# Patient Record
Sex: FEMALE | Race: OTHER | NOT HISPANIC OR LATINO
[De-identification: names, ages, dates, MRNs, and addresses within clinical notes are randomized per-mention and may not be internally consistent; named-entity substitution may affect disease eponyms.]

---

## 2020-07-08 ENCOUNTER — TRANSCRIPTION ENCOUNTER (OUTPATIENT)
Age: 50
End: 2020-07-08

## 2020-07-09 ENCOUNTER — INPATIENT (INPATIENT)
Facility: HOSPITAL | Age: 50
LOS: 0 days | Discharge: HOME | DRG: 351 | End: 2020-07-10
Attending: SURGERY | Admitting: SURGERY
Payer: COMMERCIAL

## 2020-07-09 ENCOUNTER — EMERGENCY (EMERGENCY)
Facility: HOSPITAL | Age: 50
LOS: 1 days | Discharge: HOME | End: 2020-07-09
Attending: EMERGENCY MEDICINE
Payer: COMMERCIAL

## 2020-07-09 VITALS
WEIGHT: 138.01 LBS | TEMPERATURE: 98 F | HEIGHT: 61 IN | SYSTOLIC BLOOD PRESSURE: 148 MMHG | RESPIRATION RATE: 16 BRPM | OXYGEN SATURATION: 99 % | DIASTOLIC BLOOD PRESSURE: 86 MMHG | HEART RATE: 78 BPM

## 2020-07-09 VITALS
OXYGEN SATURATION: 98 % | RESPIRATION RATE: 18 BRPM | TEMPERATURE: 98 F | SYSTOLIC BLOOD PRESSURE: 143 MMHG | HEIGHT: 61 IN | WEIGHT: 138.89 LBS | DIASTOLIC BLOOD PRESSURE: 92 MMHG | HEART RATE: 87 BPM

## 2020-07-09 DIAGNOSIS — K40.90 UNILATERAL INGUINAL HERNIA, WITHOUT OBSTRUCTION OR GANGRENE, NOT SPECIFIED AS RECURRENT: ICD-10-CM

## 2020-07-09 LAB
ALBUMIN SERPL ELPH-MCNC: 3.9 G/DL — SIGNIFICANT CHANGE UP (ref 3.5–5)
ALP SERPL-CCNC: 54 U/L — SIGNIFICANT CHANGE UP (ref 40–120)
ALT FLD-CCNC: 18 U/L DA — SIGNIFICANT CHANGE UP (ref 10–60)
ANION GAP SERPL CALC-SCNC: 8 MMOL/L — SIGNIFICANT CHANGE UP (ref 5–17)
APPEARANCE UR: CLEAR — SIGNIFICANT CHANGE UP
APTT BLD: 29.3 SEC — SIGNIFICANT CHANGE UP (ref 27.5–35.5)
AST SERPL-CCNC: 15 U/L — SIGNIFICANT CHANGE UP (ref 10–40)
BASOPHILS # BLD AUTO: 0.07 K/UL — SIGNIFICANT CHANGE UP (ref 0–0.2)
BASOPHILS NFR BLD AUTO: 0.8 % — SIGNIFICANT CHANGE UP (ref 0–2)
BILIRUB SERPL-MCNC: 0.4 MG/DL — SIGNIFICANT CHANGE UP (ref 0.2–1.2)
BILIRUB UR-MCNC: NEGATIVE — SIGNIFICANT CHANGE UP
BLD GP AB SCN SERPL QL: SIGNIFICANT CHANGE UP
BUN SERPL-MCNC: 7 MG/DL — SIGNIFICANT CHANGE UP (ref 7–18)
CALCIUM SERPL-MCNC: 8.6 MG/DL — SIGNIFICANT CHANGE UP (ref 8.4–10.5)
CHLORIDE SERPL-SCNC: 106 MMOL/L — SIGNIFICANT CHANGE UP (ref 96–108)
CO2 SERPL-SCNC: 26 MMOL/L — SIGNIFICANT CHANGE UP (ref 22–31)
COLOR SPEC: YELLOW — SIGNIFICANT CHANGE UP
CREAT SERPL-MCNC: 0.59 MG/DL — SIGNIFICANT CHANGE UP (ref 0.5–1.3)
DIFF PNL FLD: ABNORMAL
EOSINOPHIL # BLD AUTO: 0.12 K/UL — SIGNIFICANT CHANGE UP (ref 0–0.5)
EOSINOPHIL NFR BLD AUTO: 1.3 % — SIGNIFICANT CHANGE UP (ref 0–6)
EPI CELLS # UR: SIGNIFICANT CHANGE UP /HPF
GLUCOSE SERPL-MCNC: 87 MG/DL — SIGNIFICANT CHANGE UP (ref 70–99)
GLUCOSE UR QL: NEGATIVE — SIGNIFICANT CHANGE UP
HCG SERPL-ACNC: <1 MIU/ML — SIGNIFICANT CHANGE UP
HCT VFR BLD CALC: 39.1 % — SIGNIFICANT CHANGE UP (ref 34.5–45)
HGB BLD-MCNC: 12.7 G/DL — SIGNIFICANT CHANGE UP (ref 11.5–15.5)
IMM GRANULOCYTES NFR BLD AUTO: 0.2 % — SIGNIFICANT CHANGE UP (ref 0–1.5)
INR BLD: 1.11 RATIO — SIGNIFICANT CHANGE UP (ref 0.88–1.16)
KETONES UR-MCNC: ABNORMAL
LEUKOCYTE ESTERASE UR-ACNC: ABNORMAL
LYMPHOCYTES # BLD AUTO: 1.71 K/UL — SIGNIFICANT CHANGE UP (ref 1–3.3)
LYMPHOCYTES # BLD AUTO: 18.4 % — SIGNIFICANT CHANGE UP (ref 13–44)
MCHC RBC-ENTMCNC: 28.1 PG — SIGNIFICANT CHANGE UP (ref 27–34)
MCHC RBC-ENTMCNC: 32.5 GM/DL — SIGNIFICANT CHANGE UP (ref 32–36)
MCV RBC AUTO: 86.5 FL — SIGNIFICANT CHANGE UP (ref 80–100)
MONOCYTES # BLD AUTO: 0.47 K/UL — SIGNIFICANT CHANGE UP (ref 0–0.9)
MONOCYTES NFR BLD AUTO: 5.1 % — SIGNIFICANT CHANGE UP (ref 2–14)
NEUTROPHILS # BLD AUTO: 6.91 K/UL — SIGNIFICANT CHANGE UP (ref 1.8–7.4)
NEUTROPHILS NFR BLD AUTO: 74.2 % — SIGNIFICANT CHANGE UP (ref 43–77)
NITRITE UR-MCNC: NEGATIVE — SIGNIFICANT CHANGE UP
NRBC # BLD: 0 /100 WBCS — SIGNIFICANT CHANGE UP (ref 0–0)
PH UR: 6.5 — SIGNIFICANT CHANGE UP (ref 5–8)
PLATELET # BLD AUTO: 186 K/UL — SIGNIFICANT CHANGE UP (ref 150–400)
POTASSIUM SERPL-MCNC: 4.7 MMOL/L — SIGNIFICANT CHANGE UP (ref 3.5–5.3)
POTASSIUM SERPL-SCNC: 4.7 MMOL/L — SIGNIFICANT CHANGE UP (ref 3.5–5.3)
PROT SERPL-MCNC: 7.8 G/DL — SIGNIFICANT CHANGE UP (ref 6–8.3)
PROT UR-MCNC: 15
PROTHROM AB SERPL-ACNC: 12.9 SEC — SIGNIFICANT CHANGE UP (ref 10.6–13.6)
RBC # BLD: 4.52 M/UL — SIGNIFICANT CHANGE UP (ref 3.8–5.2)
RBC # FLD: 13.4 % — SIGNIFICANT CHANGE UP (ref 10.3–14.5)
RBC CASTS # UR COMP ASSIST: ABNORMAL /HPF (ref 0–2)
SARS-COV-2 RNA SPEC QL NAA+PROBE: SIGNIFICANT CHANGE UP
SODIUM SERPL-SCNC: 140 MMOL/L — SIGNIFICANT CHANGE UP (ref 135–145)
SP GR SPEC: 1.01 — SIGNIFICANT CHANGE UP (ref 1.01–1.02)
UROBILINOGEN FLD QL: NEGATIVE — SIGNIFICANT CHANGE UP
WBC # BLD: 9.3 K/UL — SIGNIFICANT CHANGE UP (ref 3.8–10.5)
WBC # FLD AUTO: 9.3 K/UL — SIGNIFICANT CHANGE UP (ref 3.8–10.5)
WBC UR QL: SIGNIFICANT CHANGE UP /HPF (ref 0–5)

## 2020-07-09 PROCEDURE — 86850 RBC ANTIBODY SCREEN: CPT

## 2020-07-09 PROCEDURE — 99223 1ST HOSP IP/OBS HIGH 75: CPT | Mod: 57

## 2020-07-09 PROCEDURE — 74177 CT ABD & PELVIS W/CONTRAST: CPT | Mod: 26

## 2020-07-09 PROCEDURE — 99284 EMERGENCY DEPT VISIT MOD MDM: CPT

## 2020-07-09 PROCEDURE — 84702 CHORIONIC GONADOTROPIN TEST: CPT

## 2020-07-09 PROCEDURE — 86901 BLOOD TYPING SEROLOGIC RH(D): CPT

## 2020-07-09 PROCEDURE — 36415 COLL VENOUS BLD VENIPUNCTURE: CPT

## 2020-07-09 PROCEDURE — 86900 BLOOD TYPING SEROLOGIC ABO: CPT

## 2020-07-09 PROCEDURE — 49650 LAP ING HERNIA REPAIR INIT: CPT

## 2020-07-09 PROCEDURE — 85027 COMPLETE CBC AUTOMATED: CPT

## 2020-07-09 PROCEDURE — 74177 CT ABD & PELVIS W/CONTRAST: CPT

## 2020-07-09 PROCEDURE — 49650 LAP ING HERNIA REPAIR INIT: CPT | Mod: AS

## 2020-07-09 PROCEDURE — 71045 X-RAY EXAM CHEST 1 VIEW: CPT | Mod: 26

## 2020-07-09 PROCEDURE — 93010 ELECTROCARDIOGRAM REPORT: CPT

## 2020-07-09 PROCEDURE — 96374 THER/PROPH/DIAG INJ IV PUSH: CPT

## 2020-07-09 PROCEDURE — 99284 EMERGENCY DEPT VISIT MOD MDM: CPT | Mod: 25

## 2020-07-09 PROCEDURE — 81001 URINALYSIS AUTO W/SCOPE: CPT

## 2020-07-09 PROCEDURE — 80053 COMPREHEN METABOLIC PANEL: CPT

## 2020-07-09 PROCEDURE — 99283 EMERGENCY DEPT VISIT LOW MDM: CPT

## 2020-07-09 RX ORDER — IOHEXOL 300 MG/ML
30 INJECTION, SOLUTION INTRAVENOUS ONCE
Refills: 0 | Status: COMPLETED | OUTPATIENT
Start: 2020-07-09 | End: 2020-07-09

## 2020-07-09 RX ORDER — SODIUM CHLORIDE 9 MG/ML
1000 INJECTION, SOLUTION INTRAVENOUS
Refills: 0 | Status: DISCONTINUED | OUTPATIENT
Start: 2020-07-09 | End: 2020-07-09

## 2020-07-09 RX ORDER — MORPHINE SULFATE 50 MG/1
2 CAPSULE, EXTENDED RELEASE ORAL EVERY 4 HOURS
Refills: 0 | Status: DISCONTINUED | OUTPATIENT
Start: 2020-07-09 | End: 2020-07-09

## 2020-07-09 RX ORDER — KETOROLAC TROMETHAMINE 30 MG/ML
30 SYRINGE (ML) INJECTION ONCE
Refills: 0 | Status: DISCONTINUED | OUTPATIENT
Start: 2020-07-09 | End: 2020-07-09

## 2020-07-09 RX ORDER — IBUPROFEN 200 MG
1 TABLET ORAL
Qty: 20 | Refills: 0
Start: 2020-07-09 | End: 2020-07-13

## 2020-07-09 RX ORDER — ONDANSETRON 8 MG/1
4 TABLET, FILM COATED ORAL EVERY 6 HOURS
Refills: 0 | Status: DISCONTINUED | OUTPATIENT
Start: 2020-07-09 | End: 2020-07-09

## 2020-07-09 RX ORDER — SODIUM CHLORIDE 9 MG/ML
1000 INJECTION INTRAMUSCULAR; INTRAVENOUS; SUBCUTANEOUS ONCE
Refills: 0 | Status: COMPLETED | OUTPATIENT
Start: 2020-07-09 | End: 2020-07-09

## 2020-07-09 RX ADMIN — IOHEXOL 30 MILLILITER(S): 300 INJECTION, SOLUTION INTRAVENOUS at 12:18

## 2020-07-09 RX ADMIN — ONDANSETRON 4 MILLIGRAM(S): 8 TABLET, FILM COATED ORAL at 22:10

## 2020-07-09 RX ADMIN — SODIUM CHLORIDE 1000 MILLILITER(S): 9 INJECTION INTRAMUSCULAR; INTRAVENOUS; SUBCUTANEOUS at 12:19

## 2020-07-09 RX ADMIN — SODIUM CHLORIDE 100 MILLILITER(S): 9 INJECTION, SOLUTION INTRAVENOUS at 22:10

## 2020-07-09 RX ADMIN — Medication 30 MILLIGRAM(S): at 14:15

## 2020-07-09 RX ADMIN — MORPHINE SULFATE 2 MILLIGRAM(S): 50 CAPSULE, EXTENDED RELEASE ORAL at 22:10

## 2020-07-09 NOTE — H&P ADULT - ATTENDING COMMENTS
51 yo woman with incarcerated left groin hernia - inguinal or femoral, likely containing omentum on CT, symptomatic for > 2 days.     Risk benefits and alternatives to the surgery discussed with the patient and informed consent was signed.   Plan for lap hernia repair tonight

## 2020-07-09 NOTE — H&P ADULT - HISTORY OF PRESENT ILLNESS
50F with no significant pmhx or pshx presents to the ED c/o left groin pain that started 2 days ago. Reports she was moving some heavy boxes around and noticed a bulge yesterday. States she's never had anything like this before. Reports she is passing gas, last bowel movement yesterday, normal for her. Pt endorses anorexia and mild nausea but no vomiting. Last oral intake 8am this morning. States she's been taking over the counter Advil which has been helping with the pain. Pt reports she has her daughter's wedding on Saturday and would prefer not to have surgery or be admitted to the hospital today. Denies fever, chills, vomiting, changes in bowel habits, obstipation, dysuria or hematuria.    Pt left hospital earlier today AMA  < from: CT Abdomen and Pelvis w/ Oral Cont and w/ IV Cont (07.09.20 @ 14:52) >    Findings: Limited sections through the lung bases demonstrate small bilateral atelectasis.    Small hypodense area in the left hepatic lobe adjacent to the falciform ligament, likely due to focal fatty infiltration. The gallbladder, common bile duct, pancreas, spleen, adrenals and left kidney appear unremarkable. There is an indeterminate 2.0 cm hypodense lesion in the right kidney with a Hounsfield unit of 27.    The appendix appears normal. No evidence for bowel obstruction, or grossly thickened bowel wall.    There is a 3.6 x 3.5 cm left inguinal hernia which contains fatand fluid.    Small fat-containing periumbilical hernia.    No evidence for free air, ascites, or enlarged lymph node.     1.2 cm cystic structure in the right adnexal region, likely representing a follicular cyst of the right ovary. The uterus appears unremarkable. The urinary bladder is under distended; it appears grossly unremarkable.    Impression:    3.6 x 3.5 cm left inguinal hernia which contains fat and fluid.    Indeterminate 2.0 cm hypodense right renal lesions; if clinically indicated,renal ultrasound may be pursued for further evaluation.  < end of copied text >    now returns tonight  will be admitted to surgery Dr Ferrera for repair william

## 2020-07-09 NOTE — H&P ADULT - NSHPPHYSICALEXAM_GEN_ALL_CORE
NAD  awake, alert  S1S2  good b/l air entry  abd soft, NT/ND, L groin bulge, non reducible, nt, no skin changes  ext no c/c/e

## 2020-07-09 NOTE — ED PROVIDER NOTE - OBJECTIVE STATEMENT
51 yo F evaluated in ED earlier today and diagnosed with L inguinal hernia that is incarcerated. Pt left AMA but has now returned. Dr Bhatti in ED requesting coags, EKG, CXR, admission for OR tonight.

## 2020-07-09 NOTE — ED ADULT NURSE REASSESSMENT NOTE - NS ED NURSE REASSESS COMMENT FT1
9851 pm - pt  seen and examined by MD  / house surgeon. as per pt she doesn't want any surgery and just want to go home.  explained the risks of .

## 2020-07-09 NOTE — ED ADULT NURSE NOTE - OBJECTIVE STATEMENT
pt is a 49 y/o female with c/o abdominal pain with nausea no signs of any distress denies any fever.

## 2020-07-09 NOTE — ED ADULT TRIAGE NOTE - CHIEF COMPLAINT QUOTE
" I have pain on my lower left side, I will need surgery tonight, I have hernia "  Patient returned after deciding she will have the surgery tonight, was discharged today at 4:30 pm.

## 2020-07-09 NOTE — CONSULT NOTE ADULT - ASSESSMENT
50F with left inguinal hernia containing omentum    - Patient advised to be admitted to the hospital to have left hernia repair; patient states she does not want to stay in the hospital and would like to attend her daughter's wedding on Saturday. Discussed with pt at length the diagnosis, urgent need for admission and treatment with surgery. Discussed with patient the risks of worsening condition including ischemia of omentum, infection and possible death from untreated infection. Pt demonstrated understanding of above risks and wishes to leave AMA. Urgently advised to return to ED if pt able to.   - Case and plan discussed with Dr. Ferrera

## 2020-07-09 NOTE — ED PROVIDER NOTE - CLINICAL SUMMARY MEDICAL DECISION MAKING FREE TEXT BOX
51 yo F here for admission for repair of hernia. Labs ordered. EKG - nsr, RBBB. Endorsed to house officer.

## 2020-07-09 NOTE — ED ADULT NURSE NOTE - OBJECTIVE STATEMENT
pt is here for abdominal pain.  pt stated that came here in the morning, now came back for hernia surgery, c/ Left lower abdominal pain 8/10, denied N/V/D or fever, denied chest pain, no distress noted at this time.

## 2020-07-09 NOTE — CONSULT NOTE ADULT - SUBJECTIVE AND OBJECTIVE BOX
GENERAL SURGERY CONSULTATION NOTE  Patient is a 50y old  Female who presents with a chief complaint of left groin swelling    HPI: 50F with no significant pmhx or pshx presents to the ED c/o left groin pain that started 2 days ago. Reports she was moving some heavy boxes around and noticed a bulge yesterday. States she's never had anything like this before. Reports she is passing gas, last bowel movement yesterday, normal for her. Pt endorses anorexia and mild nausea but no vomiting. Last oral intake 8am this morning. States she's been taking over the counter Advil which has been helping with the pain. Pt reports she has her daughter's wedding on Saturday and would prefer not to have surgery or be admitted to the hospital today. Denies fever, chills, vomiting, changes in bowel habits, obstipation, dysuria or hematuria.        REVIEW OF SYSTEMS:  Negative except stated above in HPI    PAST MEDICAL & SURGICAL HISTORY:  Denies    Allergies: No Known Allergies  Intolerances    Vital Signs Last 24 Hrs  T(C): 36.8 (2020 10:10), Max: 36.8 (2020 10:10)  T(F): 98.3 (2020 10:10), Max: 98.3 (2020 10:10)  HR: 87 (2020 10:10) (87 - 87)  BP: 143/92 (2020 10:10) (143/92 - 143/92)  RR: 18 (2020 10:10) (18 - 18)  SpO2: 98% (2020 10:10) (98% - 98%)    PHYSICAL EXAM:   General: Alert and oriented, not in acute distress  Resp: Breathing unlabored  GI: abdomen soft, nontender, no scars noted; left groin firm palpable mass, non-reducible; no overlying skin changes noted, no increased warmth/ecchymosis or erythema; no evidence of strangulation  Extremities: No pedal edema      LABS:                        12.7   9.30  )-----------( 186      ( 2020 12:40 )             39.1              07-09    140  |  106  |  7   ----------------------------<  87  4.7   |  26  |  0.59    Ca    8.6      2020 12:40    TPro  7.8  /  Alb  3.9  /  TBili  0.4  /  DBili  x   /  AST  15  /  ALT  18  /  AlkPhos  54  07-09              Urinalysis Basic - ( 2020 12:40 )  Color: Yellow / Appearance: Clear / S.010 / pH: x  Gluc: x / Ketone: Trace  / Bili: Negative / Urobili: Negative   Blood: x / Protein: 15 / Nitrite: Negative   Leuk Esterase: Trace / RBC: 5-10 /HPF / WBC 0-2 /HPF   Sq Epi: x / Non Sq Epi: Few /HPF / Bacteria: x    RADIOLOGY & ADDITIONAL STUDIES:  < from: CT Abdomen and Pelvis w/ Oral Cont and w/ IV Cont (20 @ 14:52) >  INTERPRETATION:  CT of the abdomen and pelvis with IV contrast  Clinical Indication: LEFT inguinal hernia  Technique: Axial multidetector CT images of the abdomen and pelvis are acquired following the administration of oral and IV contrast (90 cc Omnipaque-350 administered, 10 cc discarded).  Comparison: None.  Findings: Limited sections through the lung bases demonstrate small bilateral atelectasis.  Small hypodense area in the left hepatic lobe adjacent to the falciform ligament, likely due to focal fatty infiltration. The gallbladder, common bile duct, pancreas, spleen, adrenals and left kidney appear unremarkable. There is an indeterminate 2.0 cm hypodense lesion in the right kidney with a Hounsfield unit of 27.  The appendix appears normal. No evidence for bowel obstruction, or grossly thickened bowel wall.  There is a 3.6 x 3.5 cm left inguinal hernia which contains fat and fluid.  Small fat-containing periumbilical hernia.  No evidence for free air, ascites, or enlarged lymph node.   1.2 cm cystic structure in the right adnexal region, likely representing a follicular cyst of the right ovary. The uterus appears unremarkable. The urinary bladder is under distended; it appears grossly unremarkable.  Impression:  3.6 x 3.5 cm left inguinal hernia which contains fat and fluid.  Indeterminate 2.0 cm hypodense right renal lesions; if clinically indicated,renal ultrasound may be pursued for further evaluation.  < end of copied text >

## 2020-07-09 NOTE — H&P ADULT - ASSESSMENT
51 y/o female with LIH/history as per HPI  signed AMA earlier today  returns tonight to ED  admitted for repair of LIH

## 2020-07-09 NOTE — ED PROVIDER NOTE - NSPTACCESSSVCSAPPTDETAILS_ED_ALL_ED_FT
surgeon discussed with pt and cousin who is a physician.pt wants to go home.will return on sunday for surgery.pt daughter is getting  on saturday

## 2020-07-09 NOTE — ED PROVIDER NOTE - PHYSICAL EXAMINATION
GENERAL: well appearing, no acute distress   HEAD: atraumatic   EYES: EOMI, pink conjunctiva   ENT: moist oral mucosa   CARDIAC: RRR, no edema, distal pulses present   RESPIRATORY: lungs CTAB, no increased work of breathing   GASTROINTESTINAL: incarcerated L inguinal hernia with ttp; no overlying skin changes; no rebound or guarding, bowel sounds presents  GENITOURINARY: no CVA tenderness   MUSCULOSKELETAL: no deformity   NEUROLOGICAL: AAOx3, spontaneous movement of extremities   SKIN: intact   PSYCHIATRIC: cooperative  HEME LYMPH: no lymphadenopathy

## 2020-07-10 ENCOUNTER — TRANSCRIPTION ENCOUNTER (OUTPATIENT)
Age: 50
End: 2020-07-10

## 2020-07-10 VITALS
DIASTOLIC BLOOD PRESSURE: 82 MMHG | RESPIRATION RATE: 16 BRPM | SYSTOLIC BLOOD PRESSURE: 131 MMHG | OXYGEN SATURATION: 97 % | HEART RATE: 84 BPM | TEMPERATURE: 98 F

## 2020-07-10 PROCEDURE — 85610 PROTHROMBIN TIME: CPT

## 2020-07-10 PROCEDURE — 87635 SARS-COV-2 COVID-19 AMP PRB: CPT

## 2020-07-10 PROCEDURE — 71045 X-RAY EXAM CHEST 1 VIEW: CPT

## 2020-07-10 PROCEDURE — 36415 COLL VENOUS BLD VENIPUNCTURE: CPT

## 2020-07-10 PROCEDURE — C1889: CPT

## 2020-07-10 PROCEDURE — C1781: CPT

## 2020-07-10 PROCEDURE — 85730 THROMBOPLASTIN TIME PARTIAL: CPT

## 2020-07-10 PROCEDURE — 93005 ELECTROCARDIOGRAM TRACING: CPT

## 2020-07-10 PROCEDURE — 99285 EMERGENCY DEPT VISIT HI MDM: CPT

## 2020-07-10 RX ORDER — ACETAMINOPHEN 500 MG
650 TABLET ORAL EVERY 6 HOURS
Refills: 0 | Status: DISCONTINUED | OUTPATIENT
Start: 2020-07-10 | End: 2020-07-10

## 2020-07-10 RX ORDER — MORPHINE SULFATE 50 MG/1
2 CAPSULE, EXTENDED RELEASE ORAL EVERY 6 HOURS
Refills: 0 | Status: DISCONTINUED | OUTPATIENT
Start: 2020-07-10 | End: 2020-07-10

## 2020-07-10 RX ORDER — HEPARIN SODIUM 5000 [USP'U]/ML
5000 INJECTION INTRAVENOUS; SUBCUTANEOUS EVERY 8 HOURS
Refills: 0 | Status: DISCONTINUED | OUTPATIENT
Start: 2020-07-10 | End: 2020-07-10

## 2020-07-10 RX ORDER — HYDROMORPHONE HYDROCHLORIDE 2 MG/ML
0.5 INJECTION INTRAMUSCULAR; INTRAVENOUS; SUBCUTANEOUS
Refills: 0 | Status: DISCONTINUED | OUTPATIENT
Start: 2020-07-10 | End: 2020-07-10

## 2020-07-10 RX ORDER — SODIUM CHLORIDE 9 MG/ML
1000 INJECTION, SOLUTION INTRAVENOUS
Refills: 0 | Status: DISCONTINUED | OUTPATIENT
Start: 2020-07-10 | End: 2020-07-10

## 2020-07-10 RX ORDER — ACETAMINOPHEN 500 MG
650 TABLET ORAL ONCE
Refills: 0 | Status: COMPLETED | OUTPATIENT
Start: 2020-07-10 | End: 2020-07-10

## 2020-07-10 RX ORDER — KETOROLAC TROMETHAMINE 30 MG/ML
30 SYRINGE (ML) INJECTION EVERY 6 HOURS
Refills: 0 | Status: DISCONTINUED | OUTPATIENT
Start: 2020-07-10 | End: 2020-07-10

## 2020-07-10 RX ORDER — ONDANSETRON 8 MG/1
4 TABLET, FILM COATED ORAL EVERY 6 HOURS
Refills: 0 | Status: DISCONTINUED | OUTPATIENT
Start: 2020-07-10 | End: 2020-07-10

## 2020-07-10 RX ORDER — TRAMADOL HYDROCHLORIDE 50 MG/1
1 TABLET ORAL
Qty: 12 | Refills: 0
Start: 2020-07-10 | End: 2020-07-12

## 2020-07-10 RX ORDER — HYDROMORPHONE HYDROCHLORIDE 2 MG/ML
1 INJECTION INTRAMUSCULAR; INTRAVENOUS; SUBCUTANEOUS
Refills: 0 | Status: DISCONTINUED | OUTPATIENT
Start: 2020-07-10 | End: 2020-07-10

## 2020-07-10 RX ORDER — ACETAMINOPHEN 500 MG
1000 TABLET ORAL ONCE
Refills: 0 | Status: DISCONTINUED | OUTPATIENT
Start: 2020-07-10 | End: 2020-07-10

## 2020-07-10 RX ADMIN — HEPARIN SODIUM 5000 UNIT(S): 5000 INJECTION INTRAVENOUS; SUBCUTANEOUS at 05:20

## 2020-07-10 RX ADMIN — Medication 650 MILLIGRAM(S): at 05:35

## 2020-07-10 RX ADMIN — ONDANSETRON 4 MILLIGRAM(S): 8 TABLET, FILM COATED ORAL at 08:07

## 2020-07-10 RX ADMIN — Medication 650 MILLIGRAM(S): at 12:07

## 2020-07-10 RX ADMIN — SODIUM CHLORIDE 75 MILLILITER(S): 9 INJECTION, SOLUTION INTRAVENOUS at 02:15

## 2020-07-10 RX ADMIN — Medication 650 MILLIGRAM(S): at 08:22

## 2020-07-10 NOTE — PROGRESS NOTE ADULT - ASSESSMENT
s/p laparoscopic inguinal hernia repair with mesh 7/9, doing well  1. Regular diet  2. Poss d/c home today if tolerates regular diet  3. D/w Dr. Ferrera and agreed

## 2020-07-10 NOTE — DISCHARGE NOTE NURSING/CASE MANAGEMENT/SOCIAL WORK - PATIENT PORTAL LINK FT
You can access the FollowMyHealth Patient Portal offered by [facility] by registering at the following website: [email or web address]. By joining [employer]’s FollowMyHealth portal, you will also be able to view your health information using other applications (apps) compatible with our system.

## 2020-07-10 NOTE — DISCHARGE NOTE PROVIDER - CARE PROVIDER_API CALL
Minor Ferrera (MD)  Surgery  [address] [place] [address]  Phone: [phone]  Fax: [phone]  Established Patient  Follow Up Time: 1 week

## 2020-07-10 NOTE — DISCHARGE NOTE PROVIDER - NSDCCPTREATMENT_GEN_ALL_CORE_FT
PRINCIPAL PROCEDURE  Procedure: Laparoscopic repair of nonreducible femoral hernia using mesh  Findings and Treatment:

## 2020-07-10 NOTE — BRIEF OPERATIVE NOTE - NSICDXBRIEFPROCEDURE_GEN_ALL_CORE_FT
PROCEDURES:  Laparoscopic repair of nonreducible femoral hernia using mesh 10-Jul-2020 01:11:56  Bladimir Kerr

## 2020-07-10 NOTE — DISCHARGE NOTE PROVIDER - HOSPITAL COURSE
50F with no significant pmhx or pshx presents to the ED c/o left groin pain that started 2 days ago. Reports she was moving some heavy boxes around and noticed a bulge yesterday. States she's never had anything like this before. Reports she is passing gas, last bowel movement yesterday, normal for her. Pt endorses anorexia and mild nausea but no vomiting. Last oral intake 8am this morning. States she's been taking over the counter Advil which has been helping with the pain. Pt reports she has her daughter's wedding on Saturday and would prefer not to have surgery or be admitted to the hospital today. Denies fever, chills, vomiting, changes in bowel habits, obstipation, dysuria or hematuria.      Pt left hospital earlier today AMA    < from: CT Abdomen and Pelvis w/ Oral Cont and w/ IV Cont         Findings: Limited sections through the lung bases demonstrate small bilateral atelectasis.        Small hypodense area in the left hepatic lobe adjacent to the falciform ligament, likely due to focal fatty infiltration. The gallbladder, common bile duct, pancreas, spleen, adrenals and left kidney appear unremarkable. There is an indeterminate 2.0 cm hypodense lesion in the right kidney with a Hounsfield unit of 27.        The appendix appears normal. No evidence for bowel obstruction, or grossly thickened bowel wall.        There is a 3.6 x 3.5 cm left inguinal hernia which contains fatand fluid.        Small fat-containing periumbilical hernia.        No evidence for free air, ascites, or enlarged lymph node.         1.2 cm cystic structure in the right adnexal region, likely representing a follicular cyst of the right ovary. The uterus appears unremarkable. The urinary bladder is under distended; it appears grossly unremarkable.        Impression:        3.6 x 3.5 cm left inguinal hernia which contains fat and fluid.        Indeterminate 2.0 cm hypodense right renal lesions; if clinically indicated,renal ultrasound may be pursued for further evaluation.    < end of copied text >        Pt admitted to surgery service.  Pt had laparoscopic inguinal hernia repair with mesh on 7/9. Postoperative course uncomplicated. Pt tolerated regular diet and is stable for discharge home.

## 2020-07-10 NOTE — DISCHARGE NOTE PROVIDER - NSDCMRMEDTOKEN_GEN_ALL_CORE_FT
mg oral tablet: 1 tab(s) orally every 6 hours  mg oral tablet: 1 tab(s) orally every 6 hours   traMADol 50 mg oral tablet: 1 tab(s) orally every 6 hours, As Needed -for moderate pain MDD:400mg

## 2020-07-14 ENCOUNTER — APPOINTMENT (OUTPATIENT)
Dept: SURGERY | Facility: CLINIC | Age: 50
End: 2020-07-14
Payer: COMMERCIAL

## 2020-07-14 VITALS
TEMPERATURE: 97.7 F | SYSTOLIC BLOOD PRESSURE: 134 MMHG | DIASTOLIC BLOOD PRESSURE: 89 MMHG | WEIGHT: 134 LBS | BODY MASS INDEX: 25.3 KG/M2 | HEIGHT: 61 IN | HEART RATE: 79 BPM

## 2020-07-14 DIAGNOSIS — Z82.49 FAMILY HISTORY OF ISCHEMIC HEART DISEASE AND OTHER DISEASES OF THE CIRCULATORY SYSTEM: ICD-10-CM

## 2020-07-14 DIAGNOSIS — Z78.9 OTHER SPECIFIED HEALTH STATUS: ICD-10-CM

## 2020-07-14 DIAGNOSIS — K41.90 UNILATERAL FEMORAL HERNIA, W/OUT OBSTRUCTION OR GANGRENE, NOT SPECIFIED AS RECURRENT: ICD-10-CM

## 2020-07-14 PROBLEM — Z00.00 ENCOUNTER FOR PREVENTIVE HEALTH EXAMINATION: Status: ACTIVE | Noted: 2020-07-14

## 2020-07-14 PROCEDURE — 99024 POSTOP FOLLOW-UP VISIT: CPT

## 2020-07-14 RX ORDER — IBUPROFEN 800 MG/1
TABLET, FILM COATED ORAL
Refills: 0 | Status: ACTIVE | COMMUNITY

## 2020-07-14 NOTE — HISTORY OF PRESENT ILLNESS
[de-identified] : NATALIE RICHARDS is a 50 year old female who presents in the office for postop visit. Patient s/p Laparoscopic repair of incarcerated femoral hernia with mesh on 07/10/2020. Patient reports that she feels weak probably due to not having an appetite and not eating. Patient reports constipation and she started taking MiraLAX. Patient was instructed to increase her fiber intake and fluid intake. Patient denies any fever, chills, nausea and vomiting. Patient incision sites are healing well. \par We will refer patient to GI for screening colonoscopy because of her age.

## 2020-07-14 NOTE — PHYSICAL EXAM
[No Rash or Lesion] : No rash or lesion [Alert] : alert [Oriented to Person] : oriented to person [Oriented to Place] : oriented to place [Oriented to Time] : oriented to time [Calm] : calm [de-identified] : The patient is alert, well-groomed, well developed and cheerful.  [de-identified] : Breath sounds equal and bilateral, no wheezing no rales or rhonchi  [de-identified] :  good S1, S2, no m/r/g bilateral  [de-identified] : Normoactive bowel sounds, soft and nontender, no hepatosplenomegaly or masses noted, Patient incision site is healing well.

## 2020-07-14 NOTE — ASSESSMENT
[FreeTextEntry1] : NATALIE RICHARDS is a 50 year old female who present in the office s/p Laparoscopic repair of nonreducible femoral hernia with mesh on 07/10/2020\par Patient is doing well, with expected post-operative recovery. All surgical incisions are healing well and as expected. There is no evidence of infection or complication, and patient is progressing as expected.Patient was instructed no heavy lifting  2 weeks. Patient's questions and concerns addressed to patient's satisfaction. \par \par Follow up with GI for screening colonoscopy

## 2020-08-26 NOTE — H&P ADULT - DOES THIS PATIENT HAVE A HISTORY OF OR HAS BEEN DX WITH HEART FAILURE?
Immediate Brief Procedure Note    8/26/2020    Patient:  Jas Voss    Preoperative Diagnosis:   Distal phalanx fracture/subluxation RRF  Extensor tendon disruption    Postoperative Diagnosis:   Distal phalanx fracture/subluxation RRF  Extensor tendon disruption    Procedure:    Open reduction internal fixation (ORIF) distal phalanx  Repair extensor tendon insertion    Surgeon:  Merrill Ames MD    Assistants: RACHELLE Monroy (RACHELLE Monroy's assistance was medically necessary due to her expertise in patient positioning, intraoperative retraction/use of surgical intruments, closure.)    Anesthesia Staff: Anesthesiologist: Dahlia Cheung MD  Anesthesia Assistant: Donald Mcleod  Anesthesia Technician: Rico Spangler    Anesthesia Type: General    Findings: Intra-articular fracture distal phalanx with joint subluxation. Partial extensor tendon disruption.    Estimated Blood Loss: None    Complications: None    Specimens Removed: None               no

## 2024-03-01 NOTE — ED PROVIDER NOTE - WORK/EXCUSE FORM DATE
Detail Level: Generalized General Sunscreen Counseling: I recommended regular use of a broad spectrum sunscreen with a Sun Protection Factor (SPF) of 30 or higher. Sun protective clothing can be used in lieu of sunscreen, but must be worn the entire time that the patient is exposed to the sun. 09-Jul-2020

## 2024-05-02 NOTE — ED ADULT NURSE NOTE - PATIENT IS UNABLE TO BE SCREENED DUE TO:
Patient here for right breast tenderness. The discomfort has been for the last 3 weeks. LMP 4/8/24. Last pap  6/8/22.   Other:

## 2024-12-18 NOTE — ED ADULT TRIAGE NOTE - IDEAL BODY WEIGHT(KG)
Concern for cachexia as evidenced by BMI less than 20 in the presence of known chronic disease. Contributing factors include underlying Malignancy. Treatment to include nutrition consult.      Body mass index is 13.09 kg/m².  Albumin   Date Value Ref Range Status   12/17/2024 2.1 (L) 3.4 - 4.8 g/dL Final        48
